# Patient Record
Sex: FEMALE | Race: WHITE
[De-identification: names, ages, dates, MRNs, and addresses within clinical notes are randomized per-mention and may not be internally consistent; named-entity substitution may affect disease eponyms.]

---

## 2017-04-20 ENCOUNTER — HOSPITAL ENCOUNTER (OUTPATIENT)
Dept: HOSPITAL 35 - ULTRA | Age: 77
End: 2017-04-20
Attending: OTOLARYNGOLOGY
Payer: COMMERCIAL

## 2017-04-20 DIAGNOSIS — E03.9: ICD-10-CM

## 2017-04-20 DIAGNOSIS — E04.2: Primary | ICD-10-CM

## 2020-06-09 ENCOUNTER — HOSPITAL ENCOUNTER (OUTPATIENT)
Dept: HOSPITAL 35 - ULTRA | Age: 80
End: 2020-06-09
Attending: OTOLARYNGOLOGY
Payer: COMMERCIAL

## 2020-06-09 DIAGNOSIS — E07.89: ICD-10-CM

## 2020-06-09 DIAGNOSIS — E04.1: Primary | ICD-10-CM

## 2021-10-04 ENCOUNTER — HOSPITAL ENCOUNTER (OUTPATIENT)
Dept: HOSPITAL 35 - GI | Age: 81
Discharge: HOME | End: 2021-10-04
Attending: INTERNAL MEDICINE
Payer: COMMERCIAL

## 2021-10-04 VITALS — HEIGHT: 60 IN | WEIGHT: 130.01 LBS | BODY MASS INDEX: 25.52 KG/M2

## 2021-10-04 DIAGNOSIS — Z98.890: ICD-10-CM

## 2021-10-04 DIAGNOSIS — Z85.828: ICD-10-CM

## 2021-10-04 DIAGNOSIS — E78.00: ICD-10-CM

## 2021-10-04 DIAGNOSIS — K29.50: ICD-10-CM

## 2021-10-04 DIAGNOSIS — I10: ICD-10-CM

## 2021-10-04 DIAGNOSIS — K21.9: ICD-10-CM

## 2021-10-04 DIAGNOSIS — M19.90: ICD-10-CM

## 2021-10-04 DIAGNOSIS — M10.9: ICD-10-CM

## 2021-10-04 DIAGNOSIS — R13.10: Primary | ICD-10-CM

## 2021-10-04 DIAGNOSIS — Z79.899: ICD-10-CM

## 2021-10-04 DIAGNOSIS — Z20.822: ICD-10-CM

## 2021-10-04 PROCEDURE — 62900: CPT

## 2021-10-04 PROCEDURE — 62110: CPT

## 2021-10-06 NOTE — PATH
[facility]
[address]
[place]   [address]                     PATHOLOGY RPT PROCEDURE       
_______________________________________________________________________________
 
Name:       ELLAJUNAALUPE J             Room #:                     REG ALEXANDRA GREWAL.#:      [id number]     Account #:      [id number]  
Admission:  10/04/21    Date of Birth:  [birth year]  
Discharge:                                              Report #:    [id number]
                                                        Path Case #: [id number]
_______________________________________________________________________________
 
[facility] Accession Number: [id number]
.                                                                01
Material submitted:                                        .
PART A: gastrointestinal site - RANDOM GASTRIC BX - R/O H. PYLORI
PART B: esophagus - RANDOM ESOPHAGEAL BX - R/O EOSINOPHILIC ESOPHAGITIS
.                                                                01
Clinical history:                                          .
EGD
DYSPHAGIA
GASTRITIS
.                                                                02
**********************************************************************
Diagnosis:
.
A. Gastric mucosa, random gastric, biopsy:
- Focal mild active chronic gastritis.
- H. pylori immunohistochemical stain is negative.
.
B. Squamous epithelium and scant gastric epithelium, random esophageal,
biopsy:
- Squamous epithelium with focal slight increase in intraepithelial
lymphocytes and mild reactive changes.
- There is no evidence of eosinophilic esophagitis.
- Scant gastric foveolar epithelium with no diagnostic abnormalities.
(SCA:pit; 10/06/2021)
QTP  10/06/2021  1202 Local
**********************************************************************
.                                                                02
Electronically signed:                                     .
Steve Lema DO, Pathologist
NPI- [id number]
.                                                                01
Gross description:                                         .
A. The specimen is received in formalin designated "Lupe Shah,
random gastric BX-R/O H. pylori" and consists of multiple tan irregular
tissues aggregating 0.9 x 0.6 x 0.3 cm which are submitted in toto in A1.
.
B. The specimen is received in formalin designated "Lupe Shah,
random esophageal BX-R/O eosinophilic esophagitis" and consists of
multiple tan irregular tissues aggregating 0.5 x 0.4 x 0.1 cm in aggregate
which are filtered and submitted in toto in B1. ([place]; 10/05/2021)
DKA/DKA  10/05/2021  1647 Local
.                                                                02
Pathologist provided ICD-10:
K29.50, R13.10
.                                                                02
CPT                                                        .
 
 
[address]   [address]                     PATHOLOGY RPT PROCEDURE       
_______________________________________________________________________________
 
Name:       LUPE SHAH J             Room #:                     REG [facility] 
WALT#:      [id number]     Account #:      [id number]  
Admission:  10/04/21    Date of Birth:  [birth year]  
Discharge:                                              Report #:    [id number]
                                                        Path Case #: [id number]
_______________________________________________________________________________
[id number], [id number], [id number]
Specimen Comment: A courtesy copy of this report has been sent to [phone]
[phone]
Specimen Comment: [phone]
Specimen Comment: Report sent to  / DR BLANC
***Performed at:  01
   [facility]
   [address]  [id number]
   MD Brant Pate MD Phone:  [phone]
***Performed at:  02
   [facility]
   [address]  [id number]
   MD Spencer Kerley MD Phone:  [phone]